# Patient Record
Sex: MALE | Race: WHITE | HISPANIC OR LATINO | Employment: FULL TIME | URBAN - METROPOLITAN AREA
[De-identification: names, ages, dates, MRNs, and addresses within clinical notes are randomized per-mention and may not be internally consistent; named-entity substitution may affect disease eponyms.]

---

## 2017-10-30 ENCOUNTER — APPOINTMENT (OUTPATIENT)
Dept: RADIOLOGY | Facility: MEDICAL CENTER | Age: 22
End: 2017-10-30
Attending: EMERGENCY MEDICINE

## 2017-10-30 ENCOUNTER — HOSPITAL ENCOUNTER (EMERGENCY)
Facility: MEDICAL CENTER | Age: 22
End: 2017-10-30
Attending: EMERGENCY MEDICINE

## 2017-10-30 VITALS
OXYGEN SATURATION: 98 % | RESPIRATION RATE: 16 BRPM | HEIGHT: 65 IN | BODY MASS INDEX: 26.23 KG/M2 | DIASTOLIC BLOOD PRESSURE: 73 MMHG | SYSTOLIC BLOOD PRESSURE: 117 MMHG | HEART RATE: 89 BPM | WEIGHT: 157.41 LBS | TEMPERATURE: 97.9 F

## 2017-10-30 DIAGNOSIS — S05.12XA BRUISE OF EYE, LEFT, INITIAL ENCOUNTER: ICD-10-CM

## 2017-10-30 DIAGNOSIS — D66 HEMOPHILIA (HCC): ICD-10-CM

## 2017-10-30 PROCEDURE — 70450 CT HEAD/BRAIN W/O DYE: CPT

## 2017-10-30 PROCEDURE — 85240 CLOT FACTOR VIII AHG 1 STAGE: CPT

## 2017-10-30 PROCEDURE — 99284 EMERGENCY DEPT VISIT MOD MDM: CPT

## 2017-10-30 PROCEDURE — 96374 THER/PROPH/DIAG INJ IV PUSH: CPT

## 2017-10-30 PROCEDURE — 700111 HCHG RX REV CODE 636 W/ 250 OVERRIDE (IP): Performed by: EMERGENCY MEDICINE

## 2017-10-30 PROCEDURE — 70486 CT MAXILLOFACIAL W/O DYE: CPT

## 2017-10-30 RX ADMIN — VON WILLEBRAND FACTOR/COAGULATION FACTOR VIII COMPLEX (HUMAN) 2530 UNITS: 100; 100 POWDER, FOR SOLUTION INTRAVENOUS at 20:39

## 2017-10-30 ASSESSMENT — PAIN SCALES - GENERAL: PAINLEVEL_OUTOF10: 6
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
## 2017-10-31 ENCOUNTER — HOSPITAL ENCOUNTER (EMERGENCY)
Facility: MEDICAL CENTER | Age: 22
End: 2017-10-31
Attending: EMERGENCY MEDICINE

## 2017-10-31 VITALS
TEMPERATURE: 99 F | WEIGHT: 157 LBS | RESPIRATION RATE: 16 BRPM | BODY MASS INDEX: 26.16 KG/M2 | DIASTOLIC BLOOD PRESSURE: 71 MMHG | HEIGHT: 65 IN | OXYGEN SATURATION: 96 % | HEART RATE: 85 BPM | SYSTOLIC BLOOD PRESSURE: 111 MMHG

## 2017-10-31 DIAGNOSIS — S00.83XS FACIAL CONTUSION, SEQUELA: ICD-10-CM

## 2017-10-31 DIAGNOSIS — Z86.2 HISTORY OF HEMOPHILIA: ICD-10-CM

## 2017-10-31 PROCEDURE — 99284 EMERGENCY DEPT VISIT MOD MDM: CPT

## 2017-10-31 PROCEDURE — 36415 COLL VENOUS BLD VENIPUNCTURE: CPT

## 2017-10-31 PROCEDURE — 96374 THER/PROPH/DIAG INJ IV PUSH: CPT

## 2017-10-31 PROCEDURE — 85240 CLOT FACTOR VIII AHG 1 STAGE: CPT

## 2017-10-31 PROCEDURE — 700111 HCHG RX REV CODE 636 W/ 250 OVERRIDE (IP): Performed by: EMERGENCY MEDICINE

## 2017-10-31 RX ADMIN — ANTIHEMOPHILIC FACTOR (RECOMBINANT) 3030 INT'L UNITS: KIT at 15:33

## 2017-10-31 ASSESSMENT — ENCOUNTER SYMPTOMS
FOCAL WEAKNESS: 0
FEVER: 0
HEADACHES: 0
PHOTOPHOBIA: 0
CHILLS: 0
DOUBLE VISION: 0
DIZZINESS: 0

## 2017-10-31 ASSESSMENT — LIFESTYLE VARIABLES: DO YOU DRINK ALCOHOL: NO
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
## 2017-10-31 NOTE — ED NOTES
ERP gave pt info to follow-up with the hemophilia centers [facility]. Pt states understanding.
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
## 2017-10-31 NOTE — DISCHARGE INSTRUCTIONS
Facial or Scalp Contusion  A facial or scalp contusion is a deep bruise on the face or head. Injuries to the face and head generally cause a lot of swelling, especially around the eyes. Contusions are the result of an injury that caused bleeding under the skin. The contusion may turn blue, purple, or yellow. Minor injuries will give you a painless contusion, but more severe contusions may stay painful and swollen for a few weeks.   CAUSES   A facial or scalp contusion is caused by a blunt injury or trauma to the face or head area.   SIGNS AND SYMPTOMS   · Swelling of the injured area.    · Discoloration of the injured area.    · Tenderness, soreness, or pain in the injured area.    DIAGNOSIS   The diagnosis can be made by taking a medical history and doing a physical exam. An X-ray exam, CT scan, or MRI may be needed to determine if there are any associated injuries, such as broken bones (fractures).  TREATMENT   Often, the best treatment for a facial or scalp contusion is applying cold compresses to the injured area. Over-the-counter medicines may also be recommended for pain control.   HOME CARE INSTRUCTIONS   · Only take over-the-counter or prescription medicines as directed by your health care provider.    · Apply ice to the injured area.    ¨ Put ice in a plastic bag.    ¨ Place a towel between your skin and the bag.    ¨ Leave the ice on for 20 minutes, 2-3 times a day.    SEEK MEDICAL CARE IF:  · You have bite problems.    · You have pain with chewing.    · You are concerned about facial defects.  SEEK IMMEDIATE MEDICAL CARE IF:  · You have severe pain or a headache that is not relieved by medicine.    · You have unusual sleepiness, confusion, or personality changes.    · You throw up (vomit).    · You have a persistent nosebleed.    · You have double vision or blurred vision.    · You have fluid drainage from your nose or ear.    · You have difficulty walking or using your arms or legs.    MAKE SURE YOU:    · Understand these instructions.  · Will watch your condition.  · Will get help right away if you are not doing well or get worse.     This information is not intended to replace advice given to you by your health care provider. Make sure you discuss any questions you have with your health care provider.     Document Released: 01/25/2006 Document Revised: 01/08/2016 Document Reviewed: 07/31/2014  [employer] Interactive Patient Education ©2016 [employer]  Hemophilia  Hemophilia is an uncommon bleeding disorder. It means your blood does not clot properly. As a result, you may bleed longer following an injury, especially if the injury involves the knee, ankle, or elbow. The bleeding can be severe. The severity of hemophilia is related to the amount of clotting factor in the blood.  Hemophilia is passed from parent to child through genes. This means it is hereditary. There are 2 types of hemophilia:   · Hemophilia A (also known as classic hemophilia or factor VIII deficiency). About 85% of patients have hemophilia A.  · Hemophilia B (also known as [place] disease or factor IX deficiency). About 15% of patients have hemophilia B.  CAUSES   Both types of hemophilia are caused by low levels or absence in the blood of a factor which helps with clotting. Patients with hemophilia A lack the blood clotting protein called factor VIII. Those with hemophilia B lack factor IX.   SYMPTOMS   · Bleeding in the mouth from a small cut or bite.  · Nose bleeds without any trauma.  · Bleeding easily and heavily from small cuts.  · Bruising caused by minor bumps.  · Sore and swollen joints, caused by bleeding into the joint.  · Blood in the urine or stool.  · Excessive bleeding after any surgery or tooth removal.  DIAGNOSIS   Blood tests are used to diagnose this disease. These are done if your caregiver sees bleeding problems.  TREATMENT  Patients with hemophilia receive infusions of the factors they are missing. These replacement  "infusions can be made from human blood or from a blood-like liquid (recombinant concentrate). Based on the risk of side effects and the severity of your disease, you may receive therapy only when bleeding or you may receive preventive therapy.  RISKS AND COMPLICATIONS  There are a number of potential complications you need to consider, such as:  · Risk of infection with human blood products.  ¨ Clotting factor infusions, like other blood products, can carry viruses causing HIV and hepatitis. However, recent advances in treatment have greatly increased the safety of blood products used to treat hemophilia. Advances include screening of blood donors, lab testing of donated blood, and techniques to inactivate viruses in blood and blood products.  ¨ Receiving hepatitis A and B vaccines also decreases the risk for jose these infections.  ¨ There has been some concern in the hemophilia community about Creutzfeldt-Andry disease (CJD). CJD is a disorder related to bovine spongiform encephalopathy or \"mad cow disease.\" CJD transmission is possible through blood-derived treatment products. However, transmission of CJD disease through blood or blood products has never been detected. The risk is considered extremely low. Unfortunately, there is no test to identify CJD in blood or blood donors.  ¨ To ensure absolute safety from infusion-transmitted viruses and other agents, people with hemophilia A may now be treated with factor VIII that has been produced through biotechnology. This product is called recombinant factor VIII. It is created by a process entirely free of blood products. It contains only the factor VIII needed to treat the disease. The cost of this product is more than that of the blood-derived product. However, it is the treatment of choice for those, such as newborns, who have not yet been exposed to blood products. A similar factor IX product is not yet available.  · Development of antibodies (inhibitors) " "that can cause your body to destroy factor concentrates before they are able to work.  ¨ The development of inhibitors that block the activity of clotting factors can make treatment more difficult for some patients. About 20% to 30% of people with hemophilia A develop an inhibitor. About 1% to 4% of people with hemophilia B develop an inhibitor. When inhibitors are present in large amounts, the patient may need very high quantities of infused clotting factors to slow bleeding. In some cases, even this treatment may not be effective.  ¨ Factor VIII products produced through biotechnology have been found to cause inhibitors less often (in only about 5% of patients).  · Damage to joints from treatment not being started soon enough.  ¨ The major cause of disability in hemophilia patients is chronic joint disease (arthropathy). This is caused by uncontrolled bleeding into the joints.  · Life-threatening severe bleeding (hemorrhage).  ¨ Life-threatening hemorrhage is a constant risk. Traditional treatment of hemophilia has involved \"on-demand\" treatment. This means patients are treated with clotting factor infusions only after bleeding symptoms have begun. In some countries, patients are treated with regular infusion regardless of the person's bleeding status. This approach keeps the factor level high enough that bleeding, joint destruction, and life-threatening hemorrhage are almost entirely avoided. Disadvantages to this treatment approach include the need for frequent infusions, the need for almost continuous access to veins by catheters, and the considerable cost.  Let your caregiver know if you are considering a pregnancy. You need to be completely aware of the risks and your options.  SEEK IMMEDIATE MEDICAL CARE IF:  · You develop any bleeding that is not controlled.  · You develop a tender, swollen joint.     This information is not intended to replace advice given to you by your health care provider. Make sure you " discuss any questions you have with your health care provider.     Document Released: 09/15/2004 Document Revised: 03/11/2013 Document Reviewed: 11/12/2010  Elsevier Interactive Patient Education ©2016 [employer]

## 2017-10-31 NOTE — ED NOTES
PIV removed. Catheter intact. Pressure dressing applied. Bleeding controlled. D/C instructions reviewed with pt. Pt states understanding. Pt left amb with a steady gait and family at side.

## 2017-10-31 NOTE — ED NOTES
PIV removed. Catheter intact. Dressing applied. Bleeding controlled. D/C instructions reviewed with pt. Pt states understanding. Pt left amb with a steady gait.

## 2017-11-01 LAB
FACT VIII ACT/NOR PPP: 36 % (ref 45–145)
INHIBITOR INDICATED 1863: NO

## 2017-11-03 LAB
FACT VIII ACT/NOR PPP: 7 % (ref 45–145)
INHIBITOR INDICATED 1863: NO

## 2019-01-29 ENCOUNTER — HOSPITAL ENCOUNTER (EMERGENCY)
Facility: MEDICAL CENTER | Age: 24
End: 2019-01-29
Attending: EMERGENCY MEDICINE

## 2019-01-29 VITALS
OXYGEN SATURATION: 99 % | RESPIRATION RATE: 16 BRPM | TEMPERATURE: 98.2 F | DIASTOLIC BLOOD PRESSURE: 74 MMHG | HEIGHT: 65 IN | WEIGHT: 172.18 LBS | HEART RATE: 85 BPM | SYSTOLIC BLOOD PRESSURE: 123 MMHG | BODY MASS INDEX: 28.69 KG/M2

## 2019-01-29 DIAGNOSIS — D69.9 BLEEDING DISORDER (HCC): ICD-10-CM

## 2019-01-29 DIAGNOSIS — K06.8 BLEEDING GUMS: ICD-10-CM

## 2019-01-29 LAB
ABO GROUP BLD: NORMAL
ABO GROUP BLD: NORMAL
ALBUMIN SERPL BCP-MCNC: 4.5 G/DL (ref 3.2–4.9)
ALBUMIN/GLOB SERPL: 1.3 G/DL
ALP SERPL-CCNC: 57 U/L (ref 30–99)
ALT SERPL-CCNC: 31 U/L (ref 2–50)
ANION GAP SERPL CALC-SCNC: 9 MMOL/L (ref 0–11.9)
APTT PPP: 46.9 SEC (ref 24.7–36)
AST SERPL-CCNC: 34 U/L (ref 12–45)
BASOPHILS # BLD AUTO: 0.3 % (ref 0–1.8)
BASOPHILS # BLD: 0.03 K/UL (ref 0–0.12)
BILIRUB SERPL-MCNC: 0.5 MG/DL (ref 0.1–1.5)
BLD GP AB SCN SERPL QL: NORMAL
BUN SERPL-MCNC: 14 MG/DL (ref 8–22)
CALCIUM SERPL-MCNC: 9.5 MG/DL (ref 8.5–10.5)
CHLORIDE SERPL-SCNC: 103 MMOL/L (ref 96–112)
CO2 SERPL-SCNC: 25 MMOL/L (ref 20–33)
CREAT SERPL-MCNC: 0.89 MG/DL (ref 0.5–1.4)
EOSINOPHIL # BLD AUTO: 0.01 K/UL (ref 0–0.51)
EOSINOPHIL NFR BLD: 0.1 % (ref 0–6.9)
ERYTHROCYTE [DISTWIDTH] IN BLOOD BY AUTOMATED COUNT: 40 FL (ref 35.9–50)
GLOBULIN SER CALC-MCNC: 3.5 G/DL (ref 1.9–3.5)
GLUCOSE SERPL-MCNC: 93 MG/DL (ref 65–99)
HCT VFR BLD AUTO: 40.7 % (ref 42–52)
HGB BLD-MCNC: 14.2 G/DL (ref 14–18)
IMM GRANULOCYTES # BLD AUTO: 0.04 K/UL (ref 0–0.11)
IMM GRANULOCYTES NFR BLD AUTO: 0.4 % (ref 0–0.9)
INR PPP: 1.01 (ref 0.87–1.13)
LIPASE SERPL-CCNC: 9 U/L (ref 11–82)
LYMPHOCYTES # BLD AUTO: 2.48 K/UL (ref 1–4.8)
LYMPHOCYTES NFR BLD: 23.8 % (ref 22–41)
MCH RBC QN AUTO: 30.1 PG (ref 27–33)
MCHC RBC AUTO-ENTMCNC: 34.9 G/DL (ref 33.7–35.3)
MCV RBC AUTO: 86.2 FL (ref 81.4–97.8)
MONOCYTES # BLD AUTO: 1.25 K/UL (ref 0–0.85)
MONOCYTES NFR BLD AUTO: 12 % (ref 0–13.4)
NEUTROPHILS # BLD AUTO: 6.6 K/UL (ref 1.82–7.42)
NEUTROPHILS NFR BLD: 63.4 % (ref 44–72)
NRBC # BLD AUTO: 0 K/UL
NRBC BLD-RTO: 0 /100 WBC
PLATELET # BLD AUTO: 259 K/UL (ref 164–446)
PMV BLD AUTO: 9.6 FL (ref 9–12.9)
POTASSIUM SERPL-SCNC: 3.5 MMOL/L (ref 3.6–5.5)
PROT SERPL-MCNC: 8 G/DL (ref 6–8.2)
PROTHROMBIN TIME: 13.4 SEC (ref 12–14.6)
RBC # BLD AUTO: 4.72 M/UL (ref 4.7–6.1)
RH BLD: NORMAL
RH BLD: NORMAL
SODIUM SERPL-SCNC: 137 MMOL/L (ref 135–145)
WBC # BLD AUTO: 10.4 K/UL (ref 4.8–10.8)

## 2019-01-29 PROCEDURE — 99284 EMERGENCY DEPT VISIT MOD MDM: CPT

## 2019-01-29 PROCEDURE — 85240 CLOT FACTOR VIII AHG 1 STAGE: CPT

## 2019-01-29 PROCEDURE — 85025 COMPLETE CBC W/AUTO DIFF WBC: CPT

## 2019-01-29 PROCEDURE — 85610 PROTHROMBIN TIME: CPT

## 2019-01-29 PROCEDURE — 96374 THER/PROPH/DIAG INJ IV PUSH: CPT

## 2019-01-29 PROCEDURE — 700111 HCHG RX REV CODE 636 W/ 250 OVERRIDE (IP): Performed by: EMERGENCY MEDICINE

## 2019-01-29 PROCEDURE — 86850 RBC ANTIBODY SCREEN: CPT

## 2019-01-29 PROCEDURE — 86901 BLOOD TYPING SEROLOGIC RH(D): CPT

## 2019-01-29 PROCEDURE — 83690 ASSAY OF LIPASE: CPT

## 2019-01-29 PROCEDURE — 85730 THROMBOPLASTIN TIME PARTIAL: CPT

## 2019-01-29 PROCEDURE — 80053 COMPREHEN METABOLIC PANEL: CPT

## 2019-01-29 PROCEDURE — 86900 BLOOD TYPING SEROLOGIC ABO: CPT

## 2019-01-29 PROCEDURE — 36415 COLL VENOUS BLD VENIPUNCTURE: CPT

## 2019-01-29 RX ADMIN — ANTIHEMOPHILIC FACTOR (RECOMBINANT) 2316 INT'L UNITS: KIT at 22:43

## 2019-01-29 ASSESSMENT — PAIN SCALES - WONG BAKER: WONGBAKER_NUMERICALRESPONSE: DOESN'T HURT AT ALL

## 2019-01-30 ENCOUNTER — PATIENT OUTREACH (OUTPATIENT)
Dept: HEALTH INFORMATION MANAGEMENT | Facility: OTHER | Age: 24
End: 2019-01-30

## 2019-01-30 LAB
FACT VIII ACT/NOR PPP: <15 % (ref 45–145)
INHIBITOR INDICATED 1863: NO
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
## 2019-01-30 NOTE — ED TRIAGE NOTES
Ambulatory to triage with report of  Chief Complaint   Patient presents with   • Bleeding/Bruising     in gums for past week off and on.  reports Hx of bleeding disorder.  reports gums have been bleeding all day after brushing teeth   reports currently bleeding, slight blood noted in teeth.  Denies dizziness or lightheaded at this time.  Also reports body aches and fever (unknown temp) today

## 2023-08-01 ENCOUNTER — HOSPITAL ENCOUNTER (EMERGENCY)
Facility: MEDICAL CENTER | Age: 28
End: 2023-08-02
Attending: EMERGENCY MEDICINE

## 2023-08-01 DIAGNOSIS — K08.89 PAIN, DENTAL: ICD-10-CM

## 2023-08-01 DIAGNOSIS — K04.7 DENTAL INFECTION: ICD-10-CM

## 2023-08-01 PROCEDURE — 99283 EMERGENCY DEPT VISIT LOW MDM: CPT

## 2023-08-01 ASSESSMENT — PAIN DESCRIPTION - PAIN TYPE: TYPE: ACUTE PAIN

## 2023-08-02 VITALS
RESPIRATION RATE: 16 BRPM | DIASTOLIC BLOOD PRESSURE: 83 MMHG | HEART RATE: 77 BPM | WEIGHT: 179.9 LBS | HEIGHT: 66 IN | BODY MASS INDEX: 28.91 KG/M2 | SYSTOLIC BLOOD PRESSURE: 134 MMHG | OXYGEN SATURATION: 95 % | TEMPERATURE: 98.6 F

## 2023-08-02 PROCEDURE — 700102 HCHG RX REV CODE 250 W/ 637 OVERRIDE(OP): Performed by: EMERGENCY MEDICINE

## 2023-08-02 PROCEDURE — A9270 NON-COVERED ITEM OR SERVICE: HCPCS | Performed by: EMERGENCY MEDICINE

## 2023-08-02 RX ORDER — AMOXICILLIN AND CLAVULANATE POTASSIUM 875; 125 MG/1; MG/1
1 TABLET, FILM COATED ORAL ONCE
Status: COMPLETED | OUTPATIENT
Start: 2023-08-02 | End: 2023-08-02

## 2023-08-02 RX ORDER — IBUPROFEN 600 MG/1
600 TABLET ORAL EVERY 8 HOURS PRN
Qty: 30 TABLET | Refills: 0 | Status: SHIPPED | OUTPATIENT
Start: 2023-08-02

## 2023-08-02 RX ORDER — AMOXICILLIN AND CLAVULANATE POTASSIUM 875; 125 MG/1; MG/1
1 TABLET, FILM COATED ORAL 2 TIMES DAILY
Qty: 10 TABLET | Refills: 0 | Status: ACTIVE | OUTPATIENT
Start: 2023-08-02 | End: 2023-08-07

## 2023-08-02 RX ORDER — CHLORHEXIDINE GLUCONATE ORAL RINSE 1.2 MG/ML
15 SOLUTION DENTAL 2 TIMES DAILY
Qty: 420 ML | Refills: 0 | Status: SHIPPED | OUTPATIENT
Start: 2023-08-02 | End: 2023-08-16

## 2023-08-02 RX ORDER — NAPROXEN 500 MG/1
500 TABLET ORAL ONCE
Status: COMPLETED | OUTPATIENT
Start: 2023-08-02 | End: 2023-08-02

## 2023-08-02 RX ADMIN — NAPROXEN 500 MG: 500 TABLET ORAL at 00:30

## 2023-08-02 RX ADMIN — AMOXICILLIN AND CLAVULANATE POTASSIUM 1 TABLET: 875; 125 TABLET, FILM COATED ORAL at 00:30

## 2023-08-02 NOTE — ED TRIAGE NOTES
Chief Complaint   Patient presents with    Oral Swelling     Patient reports right side of face and tooth pain and swelling  x 1 week.

## 2023-08-02 NOTE — ED PROVIDER NOTES
"ED Provider Note    CHIEF COMPLAINT  Chief Complaint   Patient presents with    Oral Swelling     Patient reports right side of face and tooth pain and swelling  x 1 week.      EXTERNAL RECORDS REVIEWED  none    HPI/ROS  LIMITATION TO HISTORY   Select: : None  OUTSIDE HISTORIAN(S):  none    Farhat Mcdermott is a 28 y.o. male who presents to the emergency room for evaluation of dental pain and pain in his frontal portion of the face.  Patient states that he has had some problems with dental erosions and fractures of his upper premolars over the course of several weeks.  Possible is been going on even longer however he started to notice it more acutely within the last several days and says the pain is unbearable.  He is not established with a doctor.  He denies any voice changes, no tongue or neck swelling, no headaches or neck stiffness and denies any drainage into his mouth.  He has not taken anything for this beyond over-the-counter medications.    PAST MEDICAL HISTORY   has a past medical history of Hemophilia A (HCC) and Hemophilic arthropathy.    SURGICAL HISTORY  patient denies any surgical history    FAMILY HISTORY  History reviewed. No pertinent family history.    SOCIAL HISTORY  Social History     Tobacco Use    Smoking status: Former     Types: Cigarettes     Quit date: 10/30/2013     Years since quittin.7    Smokeless tobacco: Never   Vaping Use    Vaping Use: Never used   Substance and Sexual Activity    Alcohol use: No    Drug use: No    Sexual activity: Not on file       CURRENT MEDICATIONS  Home Medications       Reviewed by Tami Orosco R.N. (Registered Nurse) on 23 at 2146  Med List Status: Partial     Medication Last Dose Status   NON SPECIFIED  Active                    ALLERGIES  No Known Allergies    PHYSICAL EXAM  VITAL SIGNS: /83   Pulse 77   Temp 37 °C (98.6 °F) (Temporal)   Resp 16   Ht 1.676 m (5' 6\")   Wt 81.6 kg (179 lb 14.3 oz)   SpO2 95%   BMI 29.04 kg/m²  "   Genl: M sitting in chair comfortably, speaking clearly, appears in no acute distress   Head: NC/AT   ENT: Mucous membranes moist, posterior pharynx without erythema.  Sublingual tissues are soft.  Small areas of dental erosion primarily on the right upper premolar line, left upper premolar is also with some areas of erosion.  There is no evidence of periapical abscess.  uvula midline, nares patent bilaterally   Eyes: Normal sclera, pupils equal round reactive to light  Neck: Supple, FROM, + bilateral LAD appreciated   Pulmonary: Lungs are clear to auscultation bilaterally  CV:  RRR, no murmur appreciated, pulses 2+ in both upper and lower extremities,  Abdomen: soft, NT/ND; no rebound/guarding  Musculoskeletal: Pain free ROM of the neck. Moving upper and lower extremities in spontaneous and coordinated fashion  Skin: No rash or lesions.  No pallor or jaundice.  No cyanosis.     DIAGNOSTIC STUDIES / PROCEDURES    LABS  none    RADIOLOGY  none    COURSE & MEDICAL DECISION MAKING    ED Observation Status? none    INITIAL ASSESSMENT, COURSE AND PLAN  Care Narrative: Presents emergency room for bilateral teeth swelling.  There are areas of dental erosions but no evidence of facial swelling, no asymmetry, no sublingual abnormalities and no posterior oropharynx bulging or anatomical changes that would point to a deep space infection.  The patient initially presented he was having some mild discomfort was tachycardic but afebrile.  Without any medication interventions his heart rate is normalized and he assessments show areas of likely multiple dental infections without abscess and reactive lymphadenopathy is present.  No lab work or advanced medical imaging is currently indicated and I would recommend the patient be placed on oral antibiotics, antibiotic mouthwash, anti-inflammatory medications and dental follow-up.  He is advised to follow-up as needed, return promptly for development of worsening fevers, facial  swelling or any other acute evolving complaints.    There is no current indications for dental block and the patient would prefer to follow-up after taking oral medications.  Vital signs remained stable at the time of discharge.    DISPOSITION AND DISCUSSIONS  I have discussed management of the patient with the following physicians and BRENT's:  none    Discussion of management with other Q[facility] or appropriate source(s): None     Escalation of care considered, and ultimately not performed:blood analysis and diagnostic imaging    Barriers to care at this time, including but not limited to: Patient does not have established PCP.     Decision tools and prescription drugs considered including, but not limited to: Antibiotics augmentin .    FINAL DIAGNOSIS  1. Dental infection    2. Pain, dental      Electronically signed by: Smith Miller M.D., 8/2/2023 12:02 AM

## 2023-08-02 NOTE — ED NOTES
"Break RN: Pt discharged home. IV discontinued and gauze placed, pt in possession of belongings. Pt is A/o x 4, ambulatory with a steady gait. Pt provided discharge education and information pertaining to medications and follow up appointments. Pt received copy of discharge instructions and verbalized understanding. Discussed signs and symptoms to return to the ER, patient verbalized understanding.      /83   Pulse 77   Temp 37 °C (98.6 °F) (Temporal)   Resp 16   Ht 1.676 m (5' 6\")   Wt 81.6 kg (179 lb 14.3 oz)   SpO2 95%   BMI 29.04 kg/m²    "

## 2023-08-02 NOTE — ED NOTES
Pt ambulated to [facility] from [facility] w/ steady gait. On monitor, call light in reach. Chart up for ERP.